# Patient Record
Sex: FEMALE | Race: BLACK OR AFRICAN AMERICAN
[De-identification: names, ages, dates, MRNs, and addresses within clinical notes are randomized per-mention and may not be internally consistent; named-entity substitution may affect disease eponyms.]

---
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
## 2020-10-16 ENCOUNTER — HOSPITAL ENCOUNTER (OUTPATIENT)
Dept: HOSPITAL 76 - LAB.WCP | Age: 20
Discharge: HOME | End: 2020-10-16
Attending: NURSE PRACTITIONER
Payer: COMMERCIAL

## 2020-10-16 DIAGNOSIS — E66.9: Primary | ICD-10-CM

## 2020-10-16 LAB
ALBUMIN DIAFP-MCNC: 3.6 G/DL (ref 3.2–5.5)
ALBUMIN/GLOB SERPL: 0.9 {RATIO} (ref 1–2.2)
ALP SERPL-CCNC: 77 IU/L (ref 42–121)
ALT SERPL W P-5'-P-CCNC: 10 IU/L (ref 10–60)
ANION GAP SERPL CALCULATED.4IONS-SCNC: 11 MMOL/L (ref 6–13)
AST SERPL W P-5'-P-CCNC: 16 IU/L (ref 10–42)
BASOPHILS NFR BLD AUTO: 0 10^3/UL (ref 0–0.1)
BASOPHILS NFR BLD AUTO: 0.3 %
BILIRUB BLD-MCNC: 0.4 MG/DL (ref 0.2–1)
BUN SERPL-MCNC: 11 MG/DL (ref 6–20)
CALCIUM UR-MCNC: 9 MG/DL (ref 8.5–10.3)
CHLORIDE SERPL-SCNC: 104 MMOL/L (ref 101–111)
CHOLEST SERPL-MCNC: 185 MG/DL
CO2 SERPL-SCNC: 26 MMOL/L (ref 21–32)
CREAT SERPLBLD-SCNC: 0.8 MG/DL (ref 0.4–1)
EOSINOPHIL # BLD AUTO: 0.2 10^3/UL (ref 0–0.7)
EOSINOPHIL NFR BLD AUTO: 2.5 %
ERYTHROCYTE [DISTWIDTH] IN BLOOD BY AUTOMATED COUNT: 15.2 % (ref 12–15)
GLOBULIN SER-MCNC: 4.2 G/DL (ref 2.1–4.2)
GLUCOSE SERPL-MCNC: 104 MG/DL (ref 70–100)
HBA1C MFR BLD HPLC: 6.4 % (ref 4.27–6.07)
HDLC SERPL-MCNC: 44 MG/DL
HDLC SERPL: 4.2 {RATIO} (ref ?–4.4)
HGB UR QL STRIP: 12.7 G/DL (ref 12–16)
LDLC SERPL CALC-MCNC: 106 MG/DL
LDLC/HDLC SERPL: 2.4 {RATIO} (ref ?–4.4)
LYMPHOCYTES # SPEC AUTO: 1.9 10^3/UL (ref 1.5–3.5)
LYMPHOCYTES NFR BLD AUTO: 20.4 %
MCH RBC QN AUTO: 23.9 PG (ref 27–31)
MCHC RBC AUTO-ENTMCNC: 29.4 G/DL (ref 32–36)
MCV RBC AUTO: 81.2 FL (ref 81–99)
MONOCYTES # BLD AUTO: 0.4 10^3/UL (ref 0–1)
MONOCYTES NFR BLD AUTO: 4.6 %
NEUTROPHILS # BLD AUTO: 6.6 10^3/UL (ref 1.5–6.6)
NEUTROPHILS # SNV AUTO: 9.2 X10^3/UL (ref 4.8–10.8)
NEUTROPHILS NFR BLD AUTO: 71.8 %
PDW BLD AUTO: 9.4 FL (ref 7.9–10.8)
PLATELET # BLD: 471 10^3/UL (ref 130–450)
PROT SPEC-MCNC: 7.8 G/DL (ref 6.7–8.2)
RBC MAR: 5.32 10^6/UL (ref 4.2–5.4)
SODIUM SERPLBLD-SCNC: 141 MMOL/L (ref 135–145)
VLDLC SERPL-SCNC: 35 MG/DL

## 2020-10-16 PROCEDURE — 83721 ASSAY OF BLOOD LIPOPROTEIN: CPT

## 2020-10-16 PROCEDURE — 80061 LIPID PANEL: CPT

## 2020-10-16 PROCEDURE — 85025 COMPLETE CBC W/AUTO DIFF WBC: CPT

## 2020-10-16 PROCEDURE — 80053 COMPREHEN METABOLIC PANEL: CPT

## 2020-10-16 PROCEDURE — 84443 ASSAY THYROID STIM HORMONE: CPT

## 2020-10-16 PROCEDURE — 83036 HEMOGLOBIN GLYCOSYLATED A1C: CPT

## 2020-10-16 PROCEDURE — 36415 COLL VENOUS BLD VENIPUNCTURE: CPT

## 2021-04-23 ENCOUNTER — HOSPITAL ENCOUNTER (OUTPATIENT)
Dept: HOSPITAL 76 - LAB.R | Age: 21
Discharge: HOME | End: 2021-04-23
Attending: NURSE PRACTITIONER
Payer: COMMERCIAL

## 2021-04-23 DIAGNOSIS — L73.2: Primary | ICD-10-CM

## 2021-04-23 PROCEDURE — 87070 CULTURE OTHR SPECIMN AEROBIC: CPT

## 2021-04-23 PROCEDURE — 87181 SC STD AGAR DILUTION PER AGT: CPT

## 2021-04-23 PROCEDURE — 87077 CULTURE AEROBIC IDENTIFY: CPT

## 2021-04-23 PROCEDURE — 87205 SMEAR GRAM STAIN: CPT

## 2022-04-25 ENCOUNTER — HOSPITAL ENCOUNTER (OUTPATIENT)
Dept: HOSPITAL 76 - LAB.N | Age: 22
Discharge: HOME | End: 2022-04-25
Attending: FAMILY MEDICINE
Payer: COMMERCIAL

## 2022-04-25 DIAGNOSIS — L02.91: Primary | ICD-10-CM

## 2022-04-25 PROCEDURE — 87181 SC STD AGAR DILUTION PER AGT: CPT

## 2022-04-25 PROCEDURE — 87077 CULTURE AEROBIC IDENTIFY: CPT

## 2022-04-25 PROCEDURE — 87205 SMEAR GRAM STAIN: CPT

## 2022-04-25 PROCEDURE — 87070 CULTURE OTHR SPECIMN AEROBIC: CPT

## 2022-11-02 ENCOUNTER — HOSPITAL ENCOUNTER (OUTPATIENT)
Dept: HOSPITAL 76 - DI.N | Age: 22
Discharge: HOME | End: 2022-11-02
Attending: NURSE PRACTITIONER
Payer: COMMERCIAL

## 2022-11-02 DIAGNOSIS — R05.1: Primary | ICD-10-CM

## 2022-11-02 NOTE — XRAY REPORT
PROCEDURE:  Chest 2 View X-Ray

 

INDICATIONS:  COUGH

 

TECHNIQUE:  2 view(s) of the chest.  

 

COMPARISON:  None.

 

FINDINGS:  

 

Surgical changes and devices:  None.  

 

Lungs and pleura:  No pleural effusions or pneumothorax.  Lungs are clear.  

 

Mediastinum:  Mediastinal contours are normal.  Heart size is normal.  

 

Bones and chest wall:  No suspicious bony abnormalities.  Soft tissues appear unremarkable.  

 

IMPRESSION:  

No acute cardiopulmonary abnormality identified.

 

Reviewed by: Darrin Caal MD on 11/2/2022 10:38 AM JILLIAN

Approved by: Darrin Caal MD on 11/2/2022 10:38 AM JILLIAN

 

 

Station ID:  [id number]

## 2024-05-18 ENCOUNTER — HOSPITAL ENCOUNTER (OUTPATIENT)
Dept: HOSPITAL 76 - LAB | Age: 24
Discharge: HOME | End: 2024-05-18
Attending: EMERGENCY MEDICINE
Payer: COMMERCIAL

## 2024-05-18 DIAGNOSIS — R09.81: Primary | ICD-10-CM

## 2024-05-18 LAB
FLUAV RNA RESP QL NAA+PROBE: NOT DETECTED
HAEM INFLU B DNA SPEC QL NAA+PROBE: NOT DETECTED
RSV RNA RESP QL NAA+PROBE: NOT DETECTED
SARS-COV-2 RNA PNL SPEC NAA+PROBE: NOT DETECTED

## 2024-05-18 PROCEDURE — 87637 SARSCOV2&INF A&B&RSV AMP PRB: CPT
